# Patient Record
Sex: FEMALE | Race: BLACK OR AFRICAN AMERICAN | Employment: FULL TIME | URBAN - METROPOLITAN AREA
[De-identification: names, ages, dates, MRNs, and addresses within clinical notes are randomized per-mention and may not be internally consistent; named-entity substitution may affect disease eponyms.]

---

## 2018-01-15 ENCOUNTER — CONVERSION ENCOUNTER (OUTPATIENT)
Dept: RADIOLOGY | Facility: IMAGING CENTER | Age: 38
End: 2018-01-15

## 2018-02-05 ENCOUNTER — EVALUATION (OUTPATIENT)
Dept: PHYSICAL THERAPY | Facility: REHABILITATION | Age: 38
End: 2018-02-05
Payer: COMMERCIAL

## 2018-02-05 DIAGNOSIS — M54.6 PAIN IN THORACIC SPINE: Primary | ICD-10-CM

## 2018-02-05 PROCEDURE — 97161 PT EVAL LOW COMPLEX 20 MIN: CPT | Performed by: PHYSICAL THERAPIST

## 2018-02-05 PROCEDURE — G8985 CARRY GOAL STATUS: HCPCS | Performed by: PHYSICAL THERAPIST

## 2018-02-05 PROCEDURE — G8984 CARRY CURRENT STATUS: HCPCS | Performed by: PHYSICAL THERAPIST

## 2018-02-05 NOTE — LETTER
2018    ADRIANNA Eddy [facility] [address]  [facility] [address]    Patient: Swetha Neumann   YOB: 1980   Date of Visit: 2018       Dear Dr Maegan Stewart:    Please review the attached Plan of Care from Swetha Neumann recent visit  Please verify that you agree therapy should continue by signing the attached document and sending it back to our office  If you have any questions or concerns, please don't hesitate to call  Sincerely,    Pinky Lawrence, PT      Referring Provider:      I certify that I have read the below Plan of Care and certify the need for these services furnished under this plan of treatment while under my care  ADRIANNA Eddy  [address] [facility] [address]  [facility] [address]  VIA Facsimile: [phone]          PT Evaluation     Today's date: 2018  Patient name: Swetha Neumann  : 1980  MRN: [id number]  Referring provider: ADRIANNA Pearce  Dx:   Encounter Diagnosis   Name Primary?  Pain in thoracic spine Yes                  Assessment/Plan This is a 43 yo female who has been suffering with chronic thoracic, cervical and bilateral upper traps pain  She reports a daily achy feeling along with headaches  She reports that it is related to her large breasts (size GG)  Her family has a history of pain related to large breast which has been successfully ameliorated after breast reductions  Pt  Reports that she has tried medication, multiple bra options, Chiropractic, massage therapy, acupuncture, TENS, hot pack, and exercises  She reports that she has to wear 2 bras when exercising  She reports that under wire bras cut into her skin  Bras with wide straps put too much pressure on her upper traps  Evaluation findings indicate remarkable postural weakness  She has fairly significant muscle tightness and spasms t/o her cervical, thoracic and bilateral shoulder girdles      Pt  Is set on getting her breasts reduced as it has helped her other family members  Plan to instruct her in postural exercises and see how she responds      Subjective    Objective  Deep neck flexors 4-/5, Lower traps 2+/5,  Middle traps/rhombiods  3+/5, upper traps 5/5,  Core 3+/5,         Precautions: none    Daily Treatment Diary     Manual                               Exercise Diary              Elliptical             TB blackburn exercises Red  10" x10            TB ER with scap retraction             Bent over rows             Upper traps stretch             Mid traps stretch             Levator scap stretch             PPT - progression                              Modalities                                                               Flowsheet Rows    Flowsheet Row Most Recent Value   PT/OT G-Codes   Current Score  32   Projected Score  44   FOTO information reviewed  Yes   Assessment Type  Evaluation   G code set  Carrying, Moving & Handling Objects   Carrying, Moving and Handling Objects Current Status ()  CL   Carrying, Moving and Handling Objects Goal Status ()  BRYANNA Cox Apt, PT  2/5/2018,6:23 PM

## 2018-02-05 NOTE — PROGRESS NOTES
PT Evaluation     Today's date: 2018  Patient name: Marissa Barron  : 1980  MRN: [id number]  Referring provider: ADRIANNA Bernardo  Dx:   Encounter Diagnosis   Name Primary?  Pain in thoracic spine Yes                  Assessment/Plan This is a 43 yo female who has been suffering with chronic thoracic, cervical and bilateral upper traps pain  She reports a daily achy feeling along with headaches  She reports that it is related to her large breasts (size GG)  Her family has a history of pain related to large breast which has been successfully ameliorated after breast reductions  Pt  Reports that she has tried medication, multiple bra options, Chiropractic, massage therapy, acupuncture, TENS, hot pack, and exercises  She reports that she has to wear 2 bras when exercising  She reports that under wire bras cut into her skin  Bras with wide straps put too much pressure on her upper traps  Evaluation findings indicate remarkable postural weakness  She has fairly significant muscle tightness and spasms t/o her cervical, thoracic and bilateral shoulder girdles  Pt  Is set on getting her breasts reduced as it has helped her other family members  Plan to instruct her in postural exercises and see how she responds      Subjective    Objective  Deep neck flexors 4-/5, Lower traps 2+/5,  Middle traps/rhombiods  3+/5, upper traps 5/5,  Core 3+/5,         Precautions: none    Daily Treatment Diary     Manual                               Exercise Diary              Elliptical             TB blackburn exercises Red  10" x10            TB ER with scap retraction             Bent over rows             Upper traps stretch             Mid traps stretch             Levator scap stretch             PPT - progression                              Modalities                                                               Flowsheet Rows    Flowsheet Row Most Recent Value   PT/OT G-Codes   Current Score  32   Projected Score  44   FOTO information reviewed  Yes   Assessment Type  Evaluation   G code set  Carrying, Moving & Handling Objects   Carrying, Moving and Handling Objects Current Status ()  CL   Carrying, Moving and Handling Objects Goal Status ()  BRYANNA Pace, PT  2/5/2018,6:23 PM

## 2018-02-08 ENCOUNTER — OFFICE VISIT (OUTPATIENT)
Dept: PHYSICAL THERAPY | Facility: REHABILITATION | Age: 38
End: 2018-02-08
Payer: COMMERCIAL

## 2018-02-08 ENCOUNTER — TRANSCRIBE ORDERS (OUTPATIENT)
Dept: PHYSICAL THERAPY | Facility: REHABILITATION | Age: 38
End: 2018-02-08

## 2018-02-08 DIAGNOSIS — M54.6 PAIN IN THORACIC SPINE: Primary | ICD-10-CM

## 2018-02-08 DIAGNOSIS — M54.6 ACUTE BILATERAL THORACIC BACK PAIN: Primary | ICD-10-CM

## 2018-02-08 PROCEDURE — 97110 THERAPEUTIC EXERCISES: CPT

## 2018-02-08 NOTE — PROGRESS NOTES
Daily Note     Today's date: 2018  Patient name: Evita Granados  : 1980  MRN: [id number]  Referring provider: ADRIANNA Jang  Dx:   Encounter Diagnosis   Name Primary?  Pain in thoracic spine Yes                  Subjective:   Same c/o    PRECAUTIONS:  none  Objective: See treatment diary below  Manual  2-8                             Exercise Diary              Elliptical NT            TB ext,row,T,W Red  5" x10            TB ER with scap retraction Red      5"x10            Bent over rows 5# 10x            Upper traps stretch 15"x3            Mid traps stretch 15"x3            Levator scap stretch 15"x3            UBE 5'                             Modalities                                                             Assessment: Tolerated treatment well  Patient would benefit from continued PT    Tried elliptical but it was painful, musa UBE better  No worse after ex      Plan: Continue per plan of care

## 2018-02-12 ENCOUNTER — APPOINTMENT (OUTPATIENT)
Dept: PHYSICAL THERAPY | Facility: REHABILITATION | Age: 38
End: 2018-02-12
Payer: COMMERCIAL

## 2018-02-12 ENCOUNTER — OFFICE VISIT (OUTPATIENT)
Dept: PHYSICAL THERAPY | Facility: REHABILITATION | Age: 38
End: 2018-02-12
Payer: COMMERCIAL

## 2018-02-12 DIAGNOSIS — M54.6 PAIN IN THORACIC SPINE: Primary | ICD-10-CM

## 2018-02-12 PROCEDURE — 97110 THERAPEUTIC EXERCISES: CPT

## 2018-02-12 PROCEDURE — 97112 NEUROMUSCULAR REEDUCATION: CPT

## 2018-02-12 NOTE — PROGRESS NOTES
Daily Note     Today's date: 2018  Patient name: Jena Miller  : 1980  MRN: [id number]  Referring provider: ADRIANNA Mathis  Dx:   Encounter Diagnosis   Name Primary?  Pain in thoracic spine Yes                  Subjective:   Reports she musa ex well, not too sore      Objective: See treatment diary below    Manual  2-8 2-12                            Exercise Diary              Elliptical NT            TB ext,row,T,W Red  5" x10 Ext& row 20x           TB ER with scap retraction Red      5"x10 20x           Bent over rows 5# 10x 2x10           Upper traps stretch 15"x3 x           Mid traps stretch 15"x3 x           Levator scap stretch 15"x3 x           UBE 5' 10'           TB T  10 red           TB W  10  red           Plank FW  5"x5           TB Y  Red 10x                                                                                             Modalities                                                                   Assessment: Tolerated treatment well  Patient would benefit from continued PT   Cuing for form & to slow down  Significant challenge with plank      Plan: Continue per plan of care

## 2018-02-13 ENCOUNTER — APPOINTMENT (OUTPATIENT)
Dept: PHYSICAL THERAPY | Facility: REHABILITATION | Age: 38
End: 2018-02-13
Payer: COMMERCIAL

## 2018-02-14 ENCOUNTER — APPOINTMENT (OUTPATIENT)
Dept: PHYSICAL THERAPY | Facility: REHABILITATION | Age: 38
End: 2018-02-14
Payer: COMMERCIAL

## 2018-02-16 ENCOUNTER — APPOINTMENT (OUTPATIENT)
Dept: PHYSICAL THERAPY | Facility: REHABILITATION | Age: 38
End: 2018-02-16
Payer: COMMERCIAL

## 2018-02-19 ENCOUNTER — APPOINTMENT (OUTPATIENT)
Dept: PHYSICAL THERAPY | Facility: REHABILITATION | Age: 38
End: 2018-02-19
Payer: COMMERCIAL

## 2018-02-20 ENCOUNTER — OFFICE VISIT (OUTPATIENT)
Dept: PHYSICAL THERAPY | Facility: REHABILITATION | Age: 38
End: 2018-02-20
Payer: COMMERCIAL

## 2018-02-20 DIAGNOSIS — M54.6 PAIN IN THORACIC SPINE: Primary | ICD-10-CM

## 2018-02-20 PROCEDURE — 97110 THERAPEUTIC EXERCISES: CPT

## 2018-02-20 PROCEDURE — 97112 NEUROMUSCULAR REEDUCATION: CPT

## 2018-02-20 NOTE — PROGRESS NOTES
Daily Note     Today's date: 2018  Patient name: Sindhu So  : 1980  MRN: [id number]  Referring provider: ADRIANNA Galicia  Dx:   Encounter Diagnosis     ICD-10-CM    1  Pain in thoracic spine M54 6                   Subjective:  Reports she is sore from packing for a move  PRECAUTIONS:   none  Objective: See treatment diary below  Manual  2-8 2-12 2-20                           Exercise Diary              Elliptical NT            TB ext,row Red  5" x10 Ext& row 20x RED 10x & green 10x          TB ER with scap retraction Red      5"x10 20x Green 10x          Bent over rows 5# 10x 2x10 NT          Upper traps stretch 15"x3 x x          Mid traps stretch 15"x3 x x          Levator scap stretch 15"x3 x x          UBE 5' 10' x          TB T  10 red           TB W  10  red 20          Plank FW  5"x5 NT          TB Y  Red 10x 20x                       TB T   Red 20          TB alt UE   Red 10                                                     Modalities                                                                         Assessment: Tolerated treatment well  Patient would benefit from continued PT     Frequent cuing needed yet  Able to make some increases  Declined to do rest of ex, stating she was too sore from packing      Plan: Continue per plan of care

## 2018-02-21 ENCOUNTER — APPOINTMENT (OUTPATIENT)
Dept: PHYSICAL THERAPY | Facility: REHABILITATION | Age: 38
End: 2018-02-21
Payer: COMMERCIAL

## 2018-02-22 ENCOUNTER — APPOINTMENT (OUTPATIENT)
Dept: PHYSICAL THERAPY | Facility: REHABILITATION | Age: 38
End: 2018-02-22
Payer: COMMERCIAL

## 2018-02-23 ENCOUNTER — APPOINTMENT (OUTPATIENT)
Dept: PHYSICAL THERAPY | Facility: REHABILITATION | Age: 38
End: 2018-02-23
Payer: COMMERCIAL

## 2018-02-26 ENCOUNTER — OFFICE VISIT (OUTPATIENT)
Dept: PHYSICAL THERAPY | Facility: REHABILITATION | Age: 38
End: 2018-02-26
Payer: COMMERCIAL

## 2018-02-26 DIAGNOSIS — M54.6 PAIN IN THORACIC SPINE: Primary | ICD-10-CM

## 2018-02-26 PROCEDURE — 97110 THERAPEUTIC EXERCISES: CPT | Performed by: PHYSICAL THERAPIST

## 2018-02-26 PROCEDURE — 97112 NEUROMUSCULAR REEDUCATION: CPT | Performed by: PHYSICAL THERAPIST

## 2018-02-26 NOTE — PROGRESS NOTES
Daily Note     Today's date: 2018  Patient name: Karina Huff  : 1980  MRN: [id number]  Referring provider: ADRIANNA Chapman  Dx:   Encounter Diagnosis     ICD-10-CM    1  Pain in thoracic spine M54 6                   Subjective: Pt reports that she is going to physician tomorrow for re-eval       Objective: See treatment diary below    Manual  2-8 2-12 2-20                           Exercise Diary              Elliptical NT            TB ext,row Red  5" x10 Ext& row 20x RED 10x & green 10x x         TB ER with scap retraction Red      5"x10 20x Green 10x x         Bent over rows 5# 10x 2x10 NT NP         Upper traps stretch 15"x3 x x x         Mid traps stretch 15"x3 x x x         Levator scap stretch 15"x3 x x x         UBE 5' 10' x x         TB T  10 red  x         TB W  10  red 20 x         Plank FW  5"x5 NT NP         TB Y  Red 10x 20x x                      TB T   Red 20 x         TB alt UE   Red 10 x         Thoracic Ext    10         Open Books    10                          Modalities                                                             Assessment: Pt presents c/ thoracic mobility dysfunction and decreased scapular neuromuscular control  Instructed pt in HEP  Added banded pull aparts, banded W's, open books, and thoracic extensions  Tolerated treatment well  Patient would benefit from continued PT  Plan: Continue per plan of care

## 2018-04-20 LAB
APTT PPP: 25 SEC (ref 23–31)
DEPRECATED RDW RBC AUTO: 17.6 %
HCT VFR BLD AUTO: 36 % (ref 36–46)
HGB BLD-MCNC: 11.3 G/DL (ref 12–16)
MCH RBC QN AUTO: 23.7 PG (ref 26–34)
MCHC RBC AUTO-ENTMCNC: 31.5 % (ref 31–36)
MCV RBC AUTO: 75 FL (ref 80–100)
PLATELET # BLD AUTO: 391 K/MCL (ref 150–450)
PT - I.N. RATIO (HISTORICAL): 0.9 RATIO(INR)
PT, PATIENT (HISTORICAL): 9.5 SEC (ref 9.2–11.1)
RBC # BLD AUTO: 4.78 M/MCL (ref 4–5.2)
WBC # BLD AUTO: 8.5 K/MCL (ref 4.5–11)

## 2018-05-10 LAB — PREGNANCY TEST URINE (HISTORICAL): NEGATIVE

## 2018-06-26 DIAGNOSIS — D50.8 IRON DEFICIENCY ANEMIA SECONDARY TO INADEQUATE DIETARY IRON INTAKE: Primary | ICD-10-CM

## 2018-06-26 DIAGNOSIS — D50.9 IRON DEFICIENCY ANEMIA, UNSPECIFIED IRON DEFICIENCY ANEMIA TYPE: Primary | ICD-10-CM

## 2018-06-26 RX ORDER — FERROUS SULFATE TAB EC 324 MG (65 MG FE EQUIVALENT) 324 (65 FE) MG
324 TABLET DELAYED RESPONSE ORAL
Qty: 60 TABLET | Refills: 5 | Status: SHIPPED | OUTPATIENT
Start: 2018-06-26 | End: 2018-06-26 | Stop reason: SDUPTHER

## 2018-06-26 RX ORDER — FERROUS SULFATE TAB EC 324 MG (65 MG FE EQUIVALENT) 324 (65 FE) MG
324 TABLET DELAYED RESPONSE ORAL
Qty: 60 TABLET | Refills: 0 | Status: SHIPPED | OUTPATIENT
Start: 2018-06-26

## 2018-06-27 RX ORDER — FERROUS SULFATE 325(65) MG
TABLET ORAL
Qty: 60 TABLET | Refills: 3 | Status: SHIPPED | OUTPATIENT
Start: 2018-06-27

## 2019-03-04 ENCOUNTER — TELEPHONE (OUTPATIENT)
Dept: FAMILY MEDICINE CLINIC | Facility: CLINIC | Age: 39
End: 2019-03-04